# Patient Record
Sex: MALE | Race: WHITE | NOT HISPANIC OR LATINO | Employment: FULL TIME | URBAN - METROPOLITAN AREA
[De-identification: names, ages, dates, MRNs, and addresses within clinical notes are randomized per-mention and may not be internally consistent; named-entity substitution may affect disease eponyms.]

---

## 2018-03-25 ENCOUNTER — HOSPITAL ENCOUNTER (EMERGENCY)
Facility: HOSPITAL | Age: 23
Discharge: HOME | End: 2018-03-25
Attending: EMERGENCY MEDICINE

## 2018-03-25 VITALS
DIASTOLIC BLOOD PRESSURE: 64 MMHG | HEART RATE: 94 BPM | BODY MASS INDEX: 22.9 KG/M2 | TEMPERATURE: 99 F | HEIGHT: 70 IN | RESPIRATION RATE: 18 BRPM | SYSTOLIC BLOOD PRESSURE: 106 MMHG | WEIGHT: 160 LBS | OXYGEN SATURATION: 96 %

## 2018-03-25 DIAGNOSIS — K12.1 MOUTH ULCERS: ICD-10-CM

## 2018-03-25 DIAGNOSIS — J02.0 STREP PHARYNGITIS: Primary | ICD-10-CM

## 2018-03-25 LAB — DEPRECATED S PYO AG THROAT QL EIA: POSITIVE

## 2018-03-25 PROCEDURE — 99283 EMERGENCY DEPT VISIT LOW MDM: CPT | Mod: 25

## 2018-03-25 PROCEDURE — 96372 THER/PROPH/DIAG INJ SC/IM: CPT

## 2018-03-25 PROCEDURE — 87880 STREP A ASSAY W/OPTIC: CPT

## 2018-03-25 PROCEDURE — 25000003 PHARM REV CODE 250: Performed by: NURSE PRACTITIONER

## 2018-03-25 PROCEDURE — 63600175 PHARM REV CODE 636 W HCPCS: Mod: JG | Performed by: NURSE PRACTITIONER

## 2018-03-25 RX ORDER — NAPROXEN 500 MG/1
500 TABLET ORAL 2 TIMES DAILY PRN
Qty: 10 TABLET | Refills: 0 | Status: SHIPPED | OUTPATIENT
Start: 2018-03-25 | End: 2018-03-30

## 2018-03-25 RX ORDER — IBUPROFEN 600 MG/1
600 TABLET ORAL
Status: COMPLETED | OUTPATIENT
Start: 2018-03-25 | End: 2018-03-25

## 2018-03-25 RX ORDER — DEXAMETHASONE SODIUM PHOSPHATE 4 MG/ML
8 INJECTION, SOLUTION INTRA-ARTICULAR; INTRALESIONAL; INTRAMUSCULAR; INTRAVENOUS; SOFT TISSUE
Status: COMPLETED | OUTPATIENT
Start: 2018-03-25 | End: 2018-03-25

## 2018-03-25 RX ORDER — DIPHENHYDRAMINE HCL 25 MG
25 TABLET ORAL NIGHTLY PRN
COMMUNITY
End: 2018-03-25 | Stop reason: ALTCHOICE

## 2018-03-25 RX ADMIN — LIDOCAINE HYDROCHLORIDE 15 ML: 20 SOLUTION ORAL; TOPICAL at 10:03

## 2018-03-25 RX ADMIN — IBUPROFEN 600 MG: 600 TABLET, FILM COATED ORAL at 10:03

## 2018-03-25 RX ADMIN — PENICILLIN G BENZATHINE 1.2 MILLION UNITS: 1200000 INJECTION, SUSPENSION INTRAMUSCULAR at 10:03

## 2018-03-25 RX ADMIN — DEXAMETHASONE SODIUM PHOSPHATE 8 MG: 4 INJECTION, SOLUTION INTRAMUSCULAR; INTRAVENOUS at 10:03

## 2018-03-25 NOTE — ED TRIAGE NOTES
Pt to the ED with c/o gum swelling and ulcers in mouth x 4 days. Pt reports taking melatonin x 4 days ago and woke up with these symptoms. No acute distress noted.

## 2018-03-25 NOTE — DISCHARGE INSTRUCTIONS
Please return to the Emergency Department for any new or worsening symptoms including: worsening sore throat or pain, fever, chest pain, shortness of breath, loss of consciousness, dizziness, weakness, or any other concerns.     Please follow up with your Primary Care Provider within in the week. If you do not have one, you may contact the one listed on your discharge paperwork or you may also call the [facility] Appointment Desk at [phone] to schedule an appointment with one.     Please take all medication as prescribed. You have been prescribed Naproxen for pain. This is an Non-Steroidal Anti-Inflammatory (NSAID) Medication. Please do not take any additional NSAIDs while you are taking this medication including (Advil, Aleve, Motrin, Ibuprofen, Mobic\meloxicam, Naprosyn, etc.). Please stop taking this medication if you experience: weakness, itching, yellow skin or eyes, joint pains, vomiting blood, blood or black stools, unusual weight gain, or swelling in your arms, legs, hands, or feet.     Magic Mouthwash - swish and spit for mouth pain.

## 2018-03-25 NOTE — ED PROVIDER NOTES
"Encounter Date: 3/25/2018    SCRIBE #1 NOTE: I, Abbey Calles, am scribing for, and in the presence of,  Adolfo Gotti NP. I have scribed the following portions of the note - Other sections scribed: HPI and ROS.       History     Chief Complaint   Patient presents with    Allergic Reaction     reports took melatonin 4 nights ago and now has bumps in lip and lip swollen. "I'm having allergic reaction."     Chief Complaint: Allergic Reaction    HPI: This 22 y.o. Male with no known PMHx presents to the ED c/o a suspected allergic reaction. Patient took Melatonin 4 days ago to help sleep due to cold/flu like symptoms experienced days prior such as fever, rhinorrhea and sore throat. However, 3 days ago after taken Melatonin he reports awakened with sore/blisters to the inner lip and tongue with associated gum pain and painful swallowing. Pain is moderate but constant. No improvement with Benadryl. No nausea or vomiting.       The history is provided by the patient. No  was used.     Review of patient's allergies indicates:  No Known Allergies  History reviewed. No pertinent past medical history.  History reviewed. No pertinent surgical history.  History reviewed. No pertinent family history.  Social History   Substance Use Topics    Smoking status: Never Smoker    Smokeless tobacco: Never Used    Alcohol use Yes      Comment: socially     Review of Systems   Constitutional: Negative for chills and fever.   HENT: Positive for mouth sores and trouble swallowing. Negative for ear pain and sore throat.    Eyes: Negative for pain.   Gastrointestinal: Negative for nausea and vomiting.   Musculoskeletal: Negative for myalgias (arm or leg pain).   Skin: Negative for rash.   Neurological: Negative for headaches.       Physical Exam     Initial Vitals [03/25/18 0948]   BP Pulse Resp Temp SpO2   137/66 104 20 99.4 °F (37.4 °C) 97 %      MAP       89.67         Physical Exam    Nursing note and vitals " reviewed.  Constitutional: He appears well-developed and well-nourished. He is not diaphoretic. He is cooperative.  Non-toxic appearance. He does not have a sickly appearance. No distress.   HENT:   Head: Normocephalic and atraumatic.   Right Ear: Tympanic membrane and external ear normal.   Left Ear: Tympanic membrane and external ear normal.   Nose: Rhinorrhea present.   Mouth/Throat: Uvula is midline. No trismus in the jaw. Dental caries present. Posterior oropharyngeal erythema present. No tonsillar abscesses.   Poor overall dentition with plaque present on teeth. +3 tonsils with erythema. No exudate.  To circular ulcerative lesions on the internal lower lip.  The sublingual elevation or airway edema.  Patient is tolerating secretions.  No drooling.   Eyes: Conjunctivae and EOM are normal.   Neck: Full passive range of motion without pain and phonation normal. Neck supple. Normal range of motion present. No neck rigidity.   Cardiovascular: Normal rate and regular rhythm.   Pulses:       Radial pulses are 2+ on the right side, and 2+ on the left side.   Pulmonary/Chest: Breath sounds normal. No respiratory distress. He has no wheezes. He has no rhonchi. He has no rales.   Musculoskeletal: Normal range of motion.   Lymphadenopathy:     He has cervical adenopathy.        Right cervical: Superficial cervical adenopathy present.        Left cervical: Superficial cervical adenopathy present.   Neurological: He is alert and oriented to person, place, and time. He has normal strength. No sensory deficit. Coordination and gait normal. GCS eye subscore is 4. GCS verbal subscore is 5. GCS motor subscore is 6.   Skin: Skin is warm and dry. Capillary refill takes less than 2 seconds. No bruising and no rash noted. No erythema.   Psychiatric: He has a normal mood and affect. His behavior is normal. Judgment and thought content normal.         ED Course   Procedures  Labs Reviewed   THROAT SCREEN, RAPID - Abnormal; Notable for  the following:        Result Value    Rapid Strep A Screen Positive (*)     All other components within normal limits                   APC / Resident Notes:   This is an evaluation of a 22 y.o. male that presents to the Emergency Department for sore throat, gum swelling, and inner lip lesions.  He does report taking melatonin prior to the onset of symptoms however he did also report an illness with fever, cough, runny nose, and sore throat preceding his dose of melatonin.  The patient is a non-toxic, afebrile, and well appearing male. On physical exam, there is tonsillar erythema and hypertrophy without exudates.  Plaque noted on the teeth.  2 small ulcerative lesions noted on the inner lower lip. He has no trismus, uvula deviation, drooling, stridor, or respiratory distress. No sign of PTA. There is cervical lymphadenopathy.  Malodorous breath.  Neck is soft and supple with no meningeal signs. Breath sounds clear and equal bilaterally. Vital Signs Are Reassuring. Rapid Strep Test: Positive    Given the above findings, my overall impression is strep pharyngitis and oral ulcerative lesions. Given the above findings, I do not think the patient has OM, OE, peritonsillar abscess, retropharyngeal abscess, epiglotitis, meningitis, or airway compromise.  I do not believe this is an ALLERGIC reaction, likely believe it is a sequela of his strep pharyngitis versus viral stomatitis.    ED Course: Magic mouthwash, Bicillin and Decadron. The patient will be discharged home. Home care: OTC medications for symptomatic relief, sore throat self care DC instructions. The diagnosis, treatment plan, instructions for follow-up and reevaluation with Primary Care as well as ED return precautions have been discussed with the patient and understanding of the information was verbalized. All questions or concerns from the patient have been addressed. This case was discussed with Dr. Craig who is in agreement with my assessment and plan.  ENOCH Hernandez, FNP-C          Scribe Attestation:   Scribe #1: I performed the above scribed service and the documentation accurately describes the services I performed. I attest to the accuracy of the note.    Attending Attestation:     Physician Attestation Statement for NP/PA:   I discussed this assessment and plan of this patient with the NP/PA, but I did not personally examine the patient. The face to face encounter was performed by the NP/PA.        Physician Attestation for Scribe:  Physician Attestation Statement for Scribe #1: I, Adolfo Gotti, JAYJAY, reviewed documentation, as scribed by Abbey Calles in my presence, and it is both accurate and complete.                    Clinical Impression:   The primary encounter diagnosis was Strep pharyngitis. A diagnosis of Mouth ulcers was also pertinent to this visit.    Disposition:   Disposition: Discharged  Condition: Stable                        COLEEN Lawson  03/25/18 1100       Minesh Craig MD  03/27/18 2245

## 2019-03-09 ENCOUNTER — HOSPITAL ENCOUNTER (EMERGENCY)
Facility: HOSPITAL | Age: 24
Discharge: HOME | End: 2019-03-09
Attending: EMERGENCY MEDICINE

## 2019-03-09 VITALS
WEIGHT: 167 LBS | TEMPERATURE: 98 F | RESPIRATION RATE: 16 BRPM | SYSTOLIC BLOOD PRESSURE: 128 MMHG | BODY MASS INDEX: 23.38 KG/M2 | HEIGHT: 71 IN | HEART RATE: 70 BPM | DIASTOLIC BLOOD PRESSURE: 78 MMHG | OXYGEN SATURATION: 99 %

## 2019-03-09 DIAGNOSIS — R09.81 NASAL CONGESTION: ICD-10-CM

## 2019-03-09 DIAGNOSIS — H92.01 OTALGIA, RIGHT: ICD-10-CM

## 2019-03-09 DIAGNOSIS — H65.91 OME (OTITIS MEDIA WITH EFFUSION), RIGHT: Primary | ICD-10-CM

## 2019-03-09 PROCEDURE — 25000003 PHARM REV CODE 250: Performed by: PHYSICIAN ASSISTANT

## 2019-03-09 PROCEDURE — 99284 EMERGENCY DEPT VISIT MOD MDM: CPT

## 2019-03-09 RX ORDER — NEOMYCIN SULFATE, POLYMYXIN B SULFATE AND HYDROCORTISONE 10; 3.5; 1 MG/ML; MG/ML; [USP'U]/ML
4 SUSPENSION/ DROPS AURICULAR (OTIC) 3 TIMES DAILY
Qty: 10 ML | Refills: 0 | Status: SHIPPED | OUTPATIENT
Start: 2019-03-09

## 2019-03-09 RX ORDER — IBUPROFEN 400 MG/1
400 TABLET ORAL
Status: COMPLETED | OUTPATIENT
Start: 2019-03-09 | End: 2019-03-09

## 2019-03-09 RX ORDER — ACETAMINOPHEN 325 MG/1
650 TABLET ORAL
Status: COMPLETED | OUTPATIENT
Start: 2019-03-09 | End: 2019-03-09

## 2019-03-09 RX ORDER — LORATADINE 10 MG/1
10 TABLET ORAL DAILY
Qty: 15 TABLET | Refills: 0 | Status: SHIPPED | OUTPATIENT
Start: 2019-03-09 | End: 2020-03-08

## 2019-03-09 RX ADMIN — IBUPROFEN 400 MG: 400 TABLET, FILM COATED ORAL at 04:03

## 2019-03-09 RX ADMIN — ACETAMINOPHEN 650 MG: 325 TABLET, FILM COATED ORAL at 04:03

## 2019-03-09 NOTE — ED PROVIDER NOTES
Encounter Date: 3/9/2019       History     Chief Complaint   Patient presents with    Otalgia     pt reports RIGHT ear pain starting this morning; pt denies taking any medications for pain     23-year-old male with no past medical history presents to the emergency department for 1 day history of atraumatic right-sided otalgia associated with nasal congestion and mild cough.  Denies headache, ear drainage, hearing loss, sore throat, and dental pain.  No history of similar symptoms. No medication has been attempted prior to arrival.  No recent use antibiotics.  Denies fever.          Review of patient's allergies indicates:  No Known Allergies  History reviewed. No pertinent past medical history.  History reviewed. No pertinent surgical history.  History reviewed. No pertinent family history.  Social History     Tobacco Use    Smoking status: Never Smoker    Smokeless tobacco: Never Used   Substance Use Topics    Alcohol use: Yes     Comment: socially    Drug use: No     Review of Systems   Constitutional: Negative for fever.   HENT: Positive for congestion and ear pain. Negative for dental problem, ear discharge, sore throat, trouble swallowing and voice change.    Respiratory: Positive for cough. Negative for shortness of breath.    Cardiovascular: Negative for chest pain.   Gastrointestinal: Negative for abdominal pain, nausea and vomiting.   Musculoskeletal: Negative for back pain and neck pain.   Skin: Negative for rash.   Neurological: Negative for headaches.   All other systems reviewed and are negative.      Physical Exam     Initial Vitals [03/09/19 0431]   BP Pulse Resp Temp SpO2   128/78 70 16 98.2 °F (36.8 °C) 99 %      MAP       --         Physical Exam    Nursing note and vitals reviewed.  Constitutional: He appears well-developed and well-nourished. He is not diaphoretic. No distress.   HENT:   Head: Normocephalic and atraumatic.   Nasal congestion present without active rhinorrhea. No sinus TTP.  TMs intact without erythema or swelling; able to discern bony landmarks. There is non purulent effusions to bilateral TMs, however.  No mastoid tenderness or swelling behind the ears. No pain with manipulation of external ears.  Very mild amount of erythema to right-sided ear canal compared to the left, however.  No oropharyngeal edema, swelling, erythema, tonsillar exudates, uvula deviation, changes in phonation, trismus, drooling, or cervical adenopathy. No meningeal signs.      Eyes: Conjunctivae and EOM are normal. Pupils are equal, round, and reactive to light. Right eye exhibits no discharge. Left eye exhibits no discharge.   Neck: Normal range of motion. No tracheal deviation present. No JVD present.   Cardiovascular: Normal rate, regular rhythm and normal heart sounds. Exam reveals no friction rub.    No murmur heard.  Pulmonary/Chest: Breath sounds normal. No stridor. No respiratory distress. He has no wheezes. He has no rhonchi. He has no rales. He exhibits no tenderness.   Musculoskeletal: Normal range of motion.   Neurological: He is alert and oriented to person, place, and time.   Skin: Skin is warm and dry. No rash and no abscess noted. No erythema. No pallor.         ED Course   Procedures  Labs Reviewed - No data to display       Imaging Results    None          Medical Decision Making:   History:   Old Medical Records: I decided to obtain old medical records.  Initial Assessment:   23-year-old male with otalgia  ED Management:  Presentation consistent with otitis media with effusion.  Will treat empirically for possible early/mild otitis externa.  No acute otitis media, mastoiditis, or meningeal signs. No dental abscess or strep throat. Low suspicion for pneumonia at this time.    Sent home with supportive care. Advising PCP and ENT follow up. Strict return precautions discussed. Agreeable to plan.                         Clinical Impression:       ICD-10-CM ICD-9-CM   1. OME (otitis media with  effusion), right H65.91 381.4   2. Otalgia, right H92.01 388.70   3. Nasal congestion R09.81 478.19                                Danny Kelsey PATraceyC  03/09/19 0449

## 2019-03-09 NOTE — ED TRIAGE NOTES
Patient arrived to ED with c/o right side earache that started this morning.  Denies fever, sore throat, congestion, runny nose, or cough.  No acute distress noted.

## 2022-10-27 ENCOUNTER — HOSPITAL ENCOUNTER (EMERGENCY)
Facility: HOSPITAL | Age: 27
Discharge: HOME | End: 2022-10-27
Attending: STUDENT IN AN ORGANIZED HEALTH CARE EDUCATION/TRAINING PROGRAM

## 2022-10-27 VITALS
OXYGEN SATURATION: 96 % | SYSTOLIC BLOOD PRESSURE: 132 MMHG | WEIGHT: 210 LBS | HEART RATE: 91 BPM | HEIGHT: 71 IN | RESPIRATION RATE: 20 BRPM | TEMPERATURE: 98 F | DIASTOLIC BLOOD PRESSURE: 80 MMHG | BODY MASS INDEX: 29.4 KG/M2

## 2022-10-27 DIAGNOSIS — B34.9 VIRAL SYNDROME: ICD-10-CM

## 2022-10-27 DIAGNOSIS — J02.9 VIRAL PHARYNGITIS: Primary | ICD-10-CM

## 2022-10-27 LAB
FLUAV AG UPPER RESP QL IA.RAPID: NOT DETECTED
FLUBV AG UPPER RESP QL IA.RAPID: NOT DETECTED
SARS-COV-2 RNA RESP QL NAA+PROBE: NOT DETECTED
STREP A PCR (OHS): NOT DETECTED

## 2022-10-27 PROCEDURE — 87651 STREP A DNA AMP PROBE: CPT | Performed by: STUDENT IN AN ORGANIZED HEALTH CARE EDUCATION/TRAINING PROGRAM

## 2022-10-27 PROCEDURE — 99283 EMERGENCY DEPT VISIT LOW MDM: CPT

## 2022-10-27 PROCEDURE — 0241U COVID/FLU A&B PCR: CPT | Performed by: STUDENT IN AN ORGANIZED HEALTH CARE EDUCATION/TRAINING PROGRAM

## 2022-10-27 NOTE — Clinical Note
"Paddy Luis (Daniel J) was seen and treated in our emergency department on 10/27/2022.  He may return to work on 10/28/2022.       If you have any questions or concerns, please don't hesitate to call.      COLEEN Birmingham"

## 2022-10-27 NOTE — Clinical Note
"Paddy Luis (Daniel J) was seen and treated in our emergency department on 10/27/2022.  He may return to work on 10/28/2022.       If you have any questions or concerns, please don't hesitate to call.      cody ADAMS    "

## 2022-10-27 NOTE — ED PROVIDER NOTES
Encounter Date: 10/27/2022       History     Chief Complaint   Patient presents with    Sore Throat     Pt reports sore throat and cough, Strep+  and RSV+ within household.     26-year-old male presents with cough, congestion and sore throat this started this morning.  He denies any nausea, vomiting or diarrhea.  Seven no shortness of breath.  He does report his daughter being positive for RSV and his wife and mother being positive for strep.  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    The history is provided by the patient. No  was used.   Review of patient's allergies indicates:  No Known Allergies  No past medical history on file.  No past surgical history on file.  No family history on file.  Social History     Tobacco Use    Smoking status: Never    Smokeless tobacco: Never   Substance Use Topics    Alcohol use: Yes     Comment: socially    Drug use: No     Review of Systems   Constitutional:  Negative for chills and fever.   HENT:  Positive for congestion and sore throat.    Respiratory:  Positive for cough.    Gastrointestinal:  Negative for diarrhea, nausea and vomiting.   Musculoskeletal:  Negative for myalgias.   Skin:  Negative for rash.   Neurological:  Negative for dizziness.   All other systems reviewed and are negative.    Physical Exam     Initial Vitals [10/27/22 1754]   BP Pulse Resp Temp SpO2   132/80 91 20 98.3 °F (36.8 °C) 96 %      MAP       --         Physical Exam    Constitutional: He appears well-developed and well-nourished.   HENT:   Head: Normocephalic.   Mouth/Throat: Oropharynx is clear and moist.   Eyes: EOM are normal.   Neck: Neck supple.   Normal range of motion.  Cardiovascular:  Normal rate, regular rhythm and normal heart sounds.           Pulmonary/Chest: Breath sounds normal. No respiratory distress.   Abdominal: He exhibits no distension.   Musculoskeletal:         General: Normal range of motion.      Cervical back: Normal  range of motion and neck supple.     Neurological: He is alert and oriented to person, place, and time.   Skin: Skin is warm and dry. Capillary refill takes less than 2 seconds.   Psychiatric: He has a normal mood and affect.       ED Course   Procedures  Labs Reviewed   COVID/FLU A&B PCR - Normal    Narrative:     The Xpert Xpress SARS-CoV-2/FLU/RSV plus is a rapid, multiplexed real-time PCR test intended for the simultaneous qualitative detection and differentiation of SARS-CoV-2, Influenza A, Influenza B, and respiratory syncytial virus (RSV) viral RNA in either nasopharyngeal swab or nasal swab specimens.         STREP GROUP A BY PCR - Normal    Narrative:     The Xpert Xpress Strep A test is a rapid, qualitative in vitro diagnostic test for the detection of Streptococcus pyogenes (Group A ß-hemolytic Streptococcus, Strep A) in throat swab specimens from patients with signs and symptoms of pharyngitis.            Imaging Results    None          Medications - No data to display  Medical Decision Making:   Differential Diagnosis:   COVID, influenza, Strep throat, viral URI  Clinical Tests:   Lab Tests: Ordered and Reviewed       <> Summary of Lab: COVID, influenza, strep all negative  ED Management:  Patient is negative for COVID, influenza and strep.  Discharge with symptomatic treatment patient is in no acute distress                        Clinical Impression:   Final diagnoses:  [J02.9] Viral pharyngitis (Primary)  [B34.9] Viral syndrome      ED Disposition Condition    Discharge Stable          ED Prescriptions    None       Follow-up Information    None          COLEEN Birmingham  10/27/22 1915

## 2022-10-28 NOTE — DISCHARGE INSTRUCTIONS
Alternate Tylenol and Motrin every 4 hours needed for pain/fever   Cepacol lozenges over-the-counter as needed for sore throat   Lots of fluids

## 2023-01-05 ENCOUNTER — HOSPITAL ENCOUNTER (EMERGENCY)
Facility: HOSPITAL | Age: 28
Discharge: HOME | End: 2023-01-05
Attending: STUDENT IN AN ORGANIZED HEALTH CARE EDUCATION/TRAINING PROGRAM
Payer: COMMERCIAL

## 2023-01-05 VITALS
OXYGEN SATURATION: 99 % | WEIGHT: 210 LBS | BODY MASS INDEX: 29.4 KG/M2 | DIASTOLIC BLOOD PRESSURE: 88 MMHG | HEIGHT: 71 IN | SYSTOLIC BLOOD PRESSURE: 151 MMHG | RESPIRATION RATE: 16 BRPM | TEMPERATURE: 99 F | HEART RATE: 84 BPM

## 2023-01-05 DIAGNOSIS — M54.50 ACUTE BILATERAL LOW BACK PAIN WITHOUT SCIATICA: ICD-10-CM
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
## 2023-01-05 PROCEDURE — 99283 EMERGENCY DEPT VISIT LOW MDM: CPT

## 2023-01-05 PROCEDURE — 25000003 PHARM REV CODE 250: Performed by: NURSE PRACTITIONER

## 2023-01-05 RX ORDER — CYCLOBENZAPRINE HCL 10 MG
10 TABLET ORAL 2 TIMES DAILY PRN
Qty: 14 TABLET | Refills: 0 | Status: SHIPPED | OUTPATIENT
Start: 2023-01-05 | End: 2023-01-12

## 2023-01-05 RX ADMIN — IBUPROFEN 800 MG: 200 TABLET, FILM COATED ORAL at 11:01

## 2023-01-05 NOTE — Clinical Note
"Paddy TAPIA" Toby was seen and treated in our emergency department on 1/5/2023.  He may return with limitations on 01/06/2023.  May return to work January 6, 2023 with limitations include no lifting more than 15 lb for an additional 5 days.  Limitations including not pushing pulling or lifting on heavier than 15 lb.     Sincerely,      COLEEN Gonzalez    "
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
## 2023-01-05 NOTE — DISCHARGE INSTRUCTIONS
Continue to take the ibuprofen 800 mg every 8 hours for pain and inflammation, take with food.  Cyclobenzaprine for muscle spasms take twice a day as needed and then wean it down to once at bedtime as needed.  May continue to apply heat or muscle rubs to the area gentle range-of-motion exercises
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
## 2023-01-05 NOTE — Clinical Note
"Paddy Luis (Daniel J) was seen and treated in our emergency department on 1/5/2023.  He may return to work on 01/06/2023.       If you have any questions or concerns, please don't hesitate to call.      COLEEN Gonzalez"